# Patient Record
Sex: FEMALE | Race: WHITE | NOT HISPANIC OR LATINO | Employment: FULL TIME | URBAN - METROPOLITAN AREA
[De-identification: names, ages, dates, MRNs, and addresses within clinical notes are randomized per-mention and may not be internally consistent; named-entity substitution may affect disease eponyms.]

---

## 2023-07-03 DIAGNOSIS — R79.89 OTHER SPECIFIED ABNORMAL FINDINGS OF BLOOD CHEMISTRY: ICD-10-CM

## 2023-07-03 RX ORDER — ERGOCALCIFEROL 1.25 MG/1
1 CAPSULE ORAL
COMMUNITY

## 2023-07-04 RX ORDER — ERGOCALCIFEROL 1.25 MG/1
1 CAPSULE ORAL
Qty: 12 CAPSULE | Refills: 3 | Status: SHIPPED | OUTPATIENT
Start: 2023-07-04 | End: 2024-07-03

## 2024-10-25 ENCOUNTER — HOSPITAL ENCOUNTER (OUTPATIENT)
Dept: RADIOLOGY | Facility: HOSPITAL | Age: 44
Discharge: HOME | End: 2024-10-25
Payer: COMMERCIAL

## 2024-10-25 ENCOUNTER — OFFICE VISIT (OUTPATIENT)
Dept: ORTHOPEDIC SURGERY | Facility: CLINIC | Age: 44
End: 2024-10-25
Payer: COMMERCIAL

## 2024-10-25 VITALS — HEIGHT: 71 IN | BODY MASS INDEX: 41.02 KG/M2 | WEIGHT: 293 LBS

## 2024-10-25 DIAGNOSIS — S89.92XA LEFT KNEE INJURY, INITIAL ENCOUNTER: ICD-10-CM

## 2024-10-25 DIAGNOSIS — S89.92XA LEFT KNEE INJURY, INITIAL ENCOUNTER: Primary | ICD-10-CM

## 2024-10-25 PROCEDURE — 1036F TOBACCO NON-USER: CPT

## 2024-10-25 PROCEDURE — 3008F BODY MASS INDEX DOCD: CPT

## 2024-10-25 PROCEDURE — 73564 X-RAY EXAM KNEE 4 OR MORE: CPT | Mod: LT

## 2024-10-25 PROCEDURE — 20610 DRAIN/INJ JOINT/BURSA W/O US: CPT

## 2024-10-25 PROCEDURE — 99204 OFFICE O/P NEW MOD 45 MIN: CPT

## 2024-10-25 RX ORDER — TRIAMCINOLONE ACETONIDE 40 MG/ML
1 INJECTION, SUSPENSION INTRA-ARTICULAR; INTRAMUSCULAR
Status: COMPLETED | OUTPATIENT
Start: 2024-10-25 | End: 2024-10-25

## 2024-10-25 ASSESSMENT — PAIN SCALES - GENERAL: PAINLEVEL_OUTOF10: 2

## 2024-10-25 ASSESSMENT — PAIN - FUNCTIONAL ASSESSMENT: PAIN_FUNCTIONAL_ASSESSMENT: 0-10

## 2024-10-25 NOTE — PROGRESS NOTES
This is a consultation from Dr. Melvin Assigned PCP Generic Provider, MD for   Chief Complaint   Patient presents with    Left Knee - Pain       This is a 44 y.o. female who presents for left knee pain caused by a recent injury where she jumped off of the stage at school and landed with her straight legs.  Patient states that she has had pain and is unable to weight-bear since then.  Patient does recall having some knee pain in that knee before the injury occurred.  Patient states she is very swollen and is experiencing generalized knee pain.  She is using crutches at home to get around.  Patient denies ever having a steroid injection or workup for her knees.  Patient denies numbness or tingling or distal extremities.    Physical Exam    There has been no interval change in this patient's past medical, surgical, medications, allergies, family history or social history since the most recent visit to a provider within our department. 14 point review of systems was performed, reviewed, and negative except for pertinent positives documented in the history of present illness.     Constitutional: well developed, well nourished female in no acute distress  Psychiatric: normal mood, appropriate affect  Eyes: sclera anicteric  HENT: normocephalic/atraumatic  CV: regular rate and rhythm   Respiratory: non labored breathing  Integumentary: no rash  Neurological: moves all extremities    Left knee exam: skin intact no lacerations or abrations.  Knee is swollen.  Tender medial joint line. negative log roll negative patellar grind. ROM 10-90 extension flexion with pain. stable to varus and valgus stress at 0 and 30 degrees. negative lachman negative posterior drawer negative tadeo. 5/5 ehl/fhl/gs/ta. silt s/s/sp/dp/t. 2+ dp/pt        Xrays were ordered by me, they were reviewed and independently interpreted by me today, they show left knee with moderate to severe joint space degeneration especially in the medial compartment.  No  presence of acute fracture or dislocation    L Inj/Asp: L knee on 10/25/2024 3:38 PM  Indications: pain and joint swelling  Details: 22 G needle, anterolateral approach  Medications: 1 mL triamcinolone acetonide 40 mg/mL    Discussion:  I discussed the conservative treatment options for knee osteoarthritis including but not limited to physical therapy, oral NSAIDS, activity and lifestyle modification, and corticosteroid injections. Pt has elected to undergo a cortisone injection today. I have explained the risk and benefits of an injection including the possibility of joint infection, bleeding, damage to cartilage, allergic reaction. Patient verbalized understanding and gave verbal consent wishes to proceed with a intra-articular cortisone injection for their knee.    Procedure:  After discussing the risk and benefits of the procedure, we proceeded with an intra-articular left knee injection. We discussed the risks and benefits and potential morbidity related to the treatment, and to the prescription medication administered in the injection    With the patient's informed verbal consent, the left knee was prepped in standard sterile fashion with Chlorhexidine. The skin was then anesthetized with ethyl chloride spray and cleaned again with Chlorhexidine. The knee was then apirated/injected with a prefilled 20-gauge syringe of 40 mg Kenalog + 4 ml Lidocaine using the lateral approach without complications.  The patient tolerated this well and felt immediate initial relief of symptoms. A bandaid was applied and the patient ambulated out of the clinic on ther own accord without difficulty. Patient was instructed to avoid physical activity for 24-48 hours to prevent the knees from swelling and may ice the knees as tolerated. Patient should contact the office if any signs of of infection appear: redness, fever, chills, drainage, swelling or warmth to the knees.  Pt understands that the injections can be repeated no sooner  than 3 months.  Procedure, treatment alternatives, risks and benefits explained, specific risks discussed. Consent was given by the patient. Immediately prior to procedure a time out was called to verify the correct patient, procedure, equipment, support staff and site/side marked as required. Patient was prepped and draped in the usual sterile fashion.             Impression/Plan: This is a 44 y.o. female with left knee pain following a recent injury.  I had an in depth discussion with the patient regarding treatment options for arthritis and their relative risks and benefits. We reviewed surgical and nonsurgical option for treatment. Treatments include anti inflammatory medications, physical therapy, weight loss, activity modification, use of assistive devices, injection therapies. We discussed current prescriptions and risks and benefits of continuation of prescription medication as apporpriate. We discussed that arthritis is often progressive over time, an in end stage arthritis surgical interventions can be considered, including arthroplasty. All questions were answered and the patient voiced their understanding.  We did a steroid injection today in her left knee in hopes that this improves her pain.  I also stated that she needs to rest ice elevate and can use compression on her left knee.  I stated that I am not too concerned about a meniscus tear at this time but if the steroid injection does not work and she is still not able to bear weight following RICE that she should come back in and we can further evaluate.  Due to her recent injury and nonweightbearing we have written a work note to be off all next week.  She is welcome to follow-up whenever.    BMI Readings from Last 1 Encounters:   10/25/24 42.54 kg/m²      Lab Results   Component Value Date    CREATININE 0.74 08/19/2022     Tobacco Use: Low Risk  (10/25/2024)    Patient History     Smoking Tobacco Use: Never     Smokeless Tobacco Use: Never      "Passive Exposure: Not on file      Computed MELD 3.0 unavailable. One or more values for this score either were not found within the given timeframe or did not fit some other criterion.  Computed MELD-Na unavailable. One or more values for this score either were not found within the given timeframe or did not fit some other criterion.       No results found for: \"HGBA1C\"  No results found for: \"STAPHMRSASCR\"  "

## 2024-10-25 NOTE — LETTER
October 25, 2024     Patient: Lizzy White   YOB: 1980   Date of Visit: 10/25/2024       To Whom It May Concern:    It is my medical opinion that Lizzy White should remain off of work until November 4th. Due to a recent injury, she cannot bear weight on her left leg.     If you have any questions or concerns, please don't hesitate to call.         Sincerely,        Gagan Hernandes PA-C    CC: No Recipients

## 2024-10-31 ENCOUNTER — OFFICE VISIT (OUTPATIENT)
Dept: ORTHOPEDIC SURGERY | Facility: CLINIC | Age: 44
End: 2024-10-31
Payer: COMMERCIAL

## 2024-10-31 ENCOUNTER — HOSPITAL ENCOUNTER (OUTPATIENT)
Dept: RADIOLOGY | Facility: HOSPITAL | Age: 44
Discharge: HOME | End: 2024-10-31
Payer: COMMERCIAL

## 2024-10-31 VITALS — HEIGHT: 71 IN | BODY MASS INDEX: 41.02 KG/M2 | WEIGHT: 293 LBS

## 2024-10-31 DIAGNOSIS — S89.92XA LEFT KNEE INJURY, INITIAL ENCOUNTER: ICD-10-CM

## 2024-10-31 DIAGNOSIS — S89.92XA LEFT KNEE INJURY, INITIAL ENCOUNTER: Primary | ICD-10-CM

## 2024-10-31 PROCEDURE — 73721 MRI JNT OF LWR EXTRE W/O DYE: CPT | Mod: LT

## 2024-10-31 PROCEDURE — 1036F TOBACCO NON-USER: CPT

## 2024-10-31 PROCEDURE — 3008F BODY MASS INDEX DOCD: CPT

## 2024-10-31 PROCEDURE — 99212 OFFICE O/P EST SF 10 MIN: CPT

## 2024-10-31 ASSESSMENT — PAIN - FUNCTIONAL ASSESSMENT: PAIN_FUNCTIONAL_ASSESSMENT: 0-10

## 2024-10-31 ASSESSMENT — PAIN DESCRIPTION - DESCRIPTORS: DESCRIPTORS: SHARP;SHOOTING;SORE;ACHING

## 2024-11-01 ENCOUNTER — TELEPHONE (OUTPATIENT)
Dept: ORTHOPEDIC SURGERY | Facility: CLINIC | Age: 44
End: 2024-11-01
Payer: COMMERCIAL

## 2024-11-06 ENCOUNTER — APPOINTMENT (OUTPATIENT)
Dept: ORTHOPEDIC SURGERY | Facility: CLINIC | Age: 44
End: 2024-11-06
Payer: COMMERCIAL

## 2024-11-06 VITALS — WEIGHT: 293 LBS | HEIGHT: 71 IN | BODY MASS INDEX: 41.02 KG/M2

## 2024-11-06 DIAGNOSIS — M25.562 PAIN OF LEFT PATELLOFEMORAL JOINT: ICD-10-CM

## 2024-11-06 DIAGNOSIS — S89.92XA ACUTE INJURY OF ANTERIOR CRUCIATE LIGAMENT, LEFT, INITIAL ENCOUNTER: ICD-10-CM

## 2024-11-06 DIAGNOSIS — S89.92XA LEFT KNEE INJURY, INITIAL ENCOUNTER: ICD-10-CM

## 2024-11-06 DIAGNOSIS — S83.282A ACUTE LATERAL MENISCUS TEAR OF LEFT KNEE, INITIAL ENCOUNTER: ICD-10-CM

## 2024-11-06 DIAGNOSIS — Z87.828 S/P ACL TEAR: ICD-10-CM

## 2024-11-06 DIAGNOSIS — S83.242A ACUTE MEDIAL MENISCUS TEAR OF LEFT KNEE, INITIAL ENCOUNTER: Primary | ICD-10-CM

## 2024-11-06 DIAGNOSIS — M94.262 CHONDROMALACIA, KNEE, LEFT: ICD-10-CM

## 2024-11-06 PROCEDURE — 1036F TOBACCO NON-USER: CPT | Performed by: ORTHOPAEDIC SURGERY

## 2024-11-06 PROCEDURE — 3008F BODY MASS INDEX DOCD: CPT | Performed by: ORTHOPAEDIC SURGERY

## 2024-11-06 PROCEDURE — 99215 OFFICE O/P EST HI 40 MIN: CPT | Performed by: ORTHOPAEDIC SURGERY

## 2024-11-06 ASSESSMENT — PAIN SCALES - GENERAL: PAINLEVEL_OUTOF10: 1

## 2024-11-06 ASSESSMENT — PAIN - FUNCTIONAL ASSESSMENT: PAIN_FUNCTIONAL_ASSESSMENT: 0-10

## 2024-11-06 NOTE — LETTER
November 6, 2024     Patient: Lizzy White   YOB: 1980   Date of Visit: 11/6/2024       To Whom It May Concern:    It is my medical opinion that Lizzy White should remain out of work until 12/6/24 .    If you have any questions or concerns, please don't hesitate to call.         Sincerely,        YUNIER Valadez MD    CC: No Recipients

## 2024-11-07 NOTE — PROGRESS NOTES
44-year-old female with a BMI of 43 presents status post injury to her left knee.  Patient stated on October 24, 2024 she was jumping off of a stage at her son's school dance and her left knee buckled.  She had significant swelling and pain.  She is unable to bear weight.  She is developed significant stiffness in the knee.  She had an MRI showing an ACL tear and was referred to see me in consultation.  She complains of pain and weakness in the left knee    Patients' self reported past medical history, medications, allergies, surgical history, family and social history as well as a 10 point review of systems has been documented in the new patient intake form and scanned into the patient's electronic medical record.  The intake form was reviewed by Dr Valadez during the office visit and signed by Dr. Valadez and the patient.  Pertinent findings are documented in the HPI.    General Multi-System Physical Exam:  Constitutional  General appearance:  Alert, oriented, and in no acute distress.  Well developed, well nourished.  Head and Face  Head and face:  Normocephalic and atraumatic.  Ears, Nose, Mouth, and Throat  External inspection of ears and nose: Normal.  Eyes:  Pupils are equal and round.  Neck  Neck:  no neck mass was observed.  Pulmonary  Respiratory effort:  no respiratory distress.  Cardiovascular  Intact distal pulses.  Lymphatic  Palpation of lymph nodes in the affected extremity:  Normal.  Skin  Skin and subcutaneous tissue:  Normal skin color and pigmentation.  Normal skin turgor.  No rashes.  Neurologic  Sensation:  normal to light touch.  Psychiatric  Judgement and insight:  Intact.  Mood and affect:  Normal.  Musculoskeletal  Left knee range of motion is 0 to 110 degrees limited by size of her leg.  She has a 3+ Lachman without endpoint positive anterior drawer patient guards her pivot shift testing negative posterior drawer knee stable to varus and valgus stress positive medial and lateral joint line  tenderness with positive Leahy's neurovasc intact left lower extremity    X-rays of the patient were ordered by Dr Valadez and obtained today.  Dr Valadez personally reviewed the results of the x-rays.    In addition, Dr Valadez independently interpreted the patient's x-rays (performed by the Radiology department) by viewing the x-ray images and this is Dr. Valadez's personal interpretation:     Minimal arthritis left knee    Dr Valadez independently interpreted the patient's MRI (performed by the Radiology department) by viewing the MRI images and this is Dr. Valadez's personal interpretation:     MRI left knee shows ACL rupture medial and lateral meniscus tearing and mild to moderate medial compartment cartilage damage        We had an extensive discussion regarding the patient's ACL rupture.  I explained to the patient that no one absolutely has to have their ACL reconstructed.  An ACL is not necessary for inline activities, for example walking, jogging, or going up and down stairs.  An ACL also is not necessary for casual recreational activities where a return-to-sport brace can be used which can help prevent subluxations of the knee.  However, studies have shown that bracing is not effective in aggressive cutting and pivoting sports.  I explained to the patient that if they wanted to return to aggressive cutting and pivoting sports, an ACL reconstruction is usually recommended before returning to this high level of activity.  We discussed the fact that if the patient's knee does continue to sublux, they have an increased risk of damaging the meniscus, cartilage and other structures in the knee.  However, no studies have been able to prove a decreased risk of long-term arthritis as a result of ACL reconstruction.  Whether the patient has an ACL reconstruction or not, they are at an increased risk of arthritis down the road.  We also discussed the graft options including bone-tendon-bone autograft, hamstring  autograft, quad autograft and allograft.  We discussed the benefits of the bone-tendon-bone being that it might heal in slightly faster; however, all grafts have been shown to be equivalent in strength in long-term followup studies.  I also explained to the patient that no matter what graft they select, we will not let them return back to aggressive cutting or pivoting sports in less than 6 or 7 months with a plan to start jogging at 3 months, running at 4 months, cutting and pivoting drills at 5 months and return to aggressive sports at 6 or 7 months.  We did discuss that bone-tendon-bone autograft have a longer incision over the front of the knee, so the patient has some abnormal sensations when they try kneeling on the knee for the rest of their life.  They may also have some numbness over the front of the knee from the incision.  In addition, there is an increased risk of anterior knee pain, which usually goes away, but it may take as long as 6 months to go away after bone-tendon-bone autograft.  With bone-tendon-bone autografts, there is also an increased risk of patellar fracture, although unlikely.  We discussed hamstring autografts and the fact that while the incision is smaller than a bone-tendon-bone autograft, it is larger than an allograft.  The incision is more over the anteromedial aspect of the knee.  Again, they may have some numbness around their incision after surgery, and they have some soreness in the hamstring muscles after surgery, which usually goes away with time, but can occasionally continue long after surgery.  Studies have shown no more than a 5% loss in hamstring strength in long-term followup studies.  We discussed quad autograft and how this is a relatively untested graft with very little long term research so we do not really know the risks or success rates of quad autografts yet since there are no long term studies yet involving quad autograft.  Lastly, we discussed the possibility  of allograft for the ACL.  The benefits of the allograft are the smallest incision possible and no need to harvest any tissue from their leg, so the patient is most comfortable after surgery.  However, we did discuss the risks of disease transmission including a risk of HIV at 1 in 1.5 million and risk of hepatitis C in approximately 1 in 150,000.  We also discussed the fact that there is a risk of disease transmission for diseases that are not yet diagnosed and, therefore, cannot be screened for.  We also discussed the fact that in patients under 25 years old, allografts are usually not recommended due to the high level of forces that the patients are going to put on their knee throughout their life.  With patients under the age of 25, we discussed how autografts are usually recommended.  However, it would be acceptable to use an allograft in someone under the age of 25 if they understood the risks vs. benefits.  We also discussed all of the risks vs. benefits of ACL reconstruction including but not limited to infection, bleeding, nerve, artery or muscle damage, possible need for additional surgery, possible need for removal of hardware, and possible continued pain or laxity in the knee.  Primary ACL reconstructions are approximately 90% effective at getting patients back to doing what they want to do.  However, some patients may require revisions or future surgeries.  The patient understood all of these risks and benefits of ACL reconstruction and all graft options.  I answered all the patient's questions.    We had a very long discussion in regards to her ACL.  After long discussion the patient decided that she wanted to try to treat the left knee conservatively.  Therefore we will give her a prescription for physical therapy for strengthening of the left knee with a diagnosis of ACL tear.  Will also get her a custom made ACL brace to try to stabilize her knee.  If the patient continues being on stable we will  then need to further discuss ACL reconstruction but she wants to try to avoid ACL reconstruction at this time.  Will try treating her nonoperatively and see how it goes.  If she has recurrent instability she should come back and see me would then need to discuss left knee ACL reconstruction.  We did talk about the fact that she does already have mild to moderate cartilage damage in the left knee with medial and lateral meniscus tearing        This patient has had a major injury to their affected extremity which has significantly traumatized it.  That would classify this problem as an acute injury that poses a threat to the function of the patient's extremity.     We discussed their surgery at great length.  This is an elective major surgery which is warranted in this case due to the patient's severely injured extremity.  We discussed identified patient and procedural risk factors and how these risk factors may increase the risk of the surgery or influence the outcome of the surgical procedure.   With this procedure, procedural risk factors include, but are not limited to, the risk of infection, bleeding, possible nerve or vasculature injury, dari fracture, and the possible risk of continued pain or weakness after the operation.  We also discussed how delaying surgery and treating this injury non-operatively may lead to a progression of the injury/disease and high risk of further morbidity.     Patient decided she wants to try treating this nonoperatively.

## 2024-11-12 ENCOUNTER — TELEPHONE (OUTPATIENT)
Dept: ORTHOPEDIC SURGERY | Facility: CLINIC | Age: 44
End: 2024-11-12
Payer: COMMERCIAL

## 2024-11-12 NOTE — LETTER
Date: 2024  RE:  Lizzy White  :  1980      To Whom It May Concern:    Our patient, Lizzy, has been under our care and now may return back to work without restrictions.    Their return to work date is:  2024    If you have questions concerning this patient's immediate care, please feel free to contact our office at [phone].    Sincerely,      aKthya Cline  Supervising Provider: JEAN CLAUDE Valadez M.D.

## 2024-11-20 NOTE — TELEPHONE ENCOUNTER
11/06/24 lt knee injury  Patient states her RTW note needs to stand she can return on 12/6/24 with no restrictions. I went ahead and typed this up. Is that ok Dennise?

## 2024-11-27 ENCOUNTER — APPOINTMENT (OUTPATIENT)
Dept: SPORTS MEDICINE | Facility: HOSPITAL | Age: 44
End: 2024-11-27
Payer: COMMERCIAL